# Patient Record
Sex: FEMALE | Race: BLACK OR AFRICAN AMERICAN | Employment: UNEMPLOYED | URBAN - METROPOLITAN AREA
[De-identification: names, ages, dates, MRNs, and addresses within clinical notes are randomized per-mention and may not be internally consistent; named-entity substitution may affect disease eponyms.]

---

## 2018-03-07 ENCOUNTER — OFFICE VISIT (OUTPATIENT)
Dept: ONCOLOGY | Age: 28
End: 2018-03-07

## 2018-03-07 DIAGNOSIS — D69.6 BENIGN GESTATIONAL THROMBOCYTOPENIA, ANTEPARTUM (HCC): Primary | ICD-10-CM

## 2018-03-07 DIAGNOSIS — O99.119 BENIGN GESTATIONAL THROMBOCYTOPENIA, ANTEPARTUM (HCC): Primary | ICD-10-CM

## 2018-03-07 NOTE — MR AVS SNAPSHOT
[address] [facility] [address] 200 [facility] 
[phone] Patient: Char Bowman MRN: [id number]:[id number] Visit Information Date & Time Provider Department Dept. Phone Encounter #  
 3/7/2018 11:30 AM Judith Brown MD [facility] [phone] [id number] Follow-up Instructions Return in about 4 weeks (around 4/4/2018). Your Appointments 4/4/2018 10:45 AM  
Office Visit with Judith Brown MD  
[address]  Oncology [facility] [facility]) Appt Note: 4 weeks [place] [address], [place] 568 [facility] [address] [place] [address], [address] [place] [address] 200 [facility] Upcoming Health Maintenance Date Due DTaP/Tdap/Td series (1 - Tdap) 3/11/2010 PAP AKA CERVICAL CYTOLOGY 3/11/2010 Influenza Age 5 to Adult 8/1/2017 Allergies as of 3/7/2018  Review Complete On: 3/7/2018 By: Daniel Izaguirre NP No Known Allergies Current Immunizations  Never Reviewed No immunizations on file. Not reviewed this visit You Were Diagnosed With   
  
 Codes Comments Benign gestational thrombocytopenia, antepartum ([facility]    -  Primary ICD-10-CM: O99.119, D69.6 ICD-9-CM: 649.33, 287.5 Vitals Smoking Status Never Smoker Your Updated Medication List  
  
   
This list is accurate as of 3/7/18 12:19 PM.  Always use your most recent med list.  
  
  
  
  
 prenatal vitamin 27 mg iron- 800 mcg Tab tablet  
  
 promethazine 12.5 mg tablet Commonly known as:  PHENERGAN Follow-up Instructions Return in about 4 weeks (around 4/4/2018). To-Do List   
 03/07/2018 Lab:  CBC WITH AUTOMATED DIFF   
  
 03/08/2018 Lab:  METABOLIC PANEL, COMPREHENSIVE [facility]! [facility] introduces Perpetuall patient portal. Now you can access parts of your medical record, email your doctor's office, and request medication refills online. 1. In your internet browser, go to [email or web address] 2. Click on the First Time User? Click Here link in the Sign In box. You will see the New Member Sign Up page. 3. Enter your Perpetuall Access Code exactly as it appears below. You will not need to use this code after youve completed the sign-up process. If you do not sign up before the expiration date, you must request a new code. · Perpetuall Access Code: [email or web address] Expires: 6/5/2018 12:18 PM 
 
4. Enter the last four digits of your Social Security Number (xxxx) and Date of Birth (mm/dd/yyyy) as indicated and click Submit. You will be taken to the next sign-up page. 5. Create a Perpetuall ID. This will be your Perpetuall login ID and cannot be changed, so think of one that is secure and easy to remember. 6. Create a Perpetuall password. You can change your password at any time. 7. Enter your Password Reset Question and Answer. This can be used at a later time if you forget your password. 8. Enter your e-mail address. You will receive e-mail notification when new information is available in [address] 9. Click Sign Up. You can now view and download portions of your medical record. 10. Click the Download Summary menu link to download a portable copy of your medical information. If you have questions, please visit the Frequently Asked Questions section of the Perpetuall website. Remember, Perpetuall is NOT to be used for urgent needs. For medical emergencies, dial 911. Now available from your iPhone and Android! Please provide this summary of care documentation to your next provider. Your primary care clinician is listed as Michael Damian. If you have any questions after today's visit, please call [phone].

## 2018-03-07 NOTE — PROGRESS NOTES
Hematology/Oncology  Progress Note    Name: Krysten December  Date: 3/7/2018  : 1990    Jennifer Milton MD     Ms. Chey Resendiz is a 33y.o. year old female who was seen for gestational thrombocytopenia      Subjective:      is in the office for follow-up. She has a history of gestational thrombocytopenia. She was last seen in 2016 during her last pregnancy. She is here today because she is 22 weeks pregnant and needs to have her platelet count monitor. Her ONEL is 2018. She reports she may be scheduled for . She states she received 2 units of platelets following her last delivery. Today she has no complaints to report. She denies abnormal bruising or bleeding. Other than pregnancy related fatigue, the patient has no concerns to report. Past medical history, family history, and social history: these were reviewed and remains unchanged. Past Medical History:   Diagnosis Date    Muscle pain     Recent change in weight      No past surgical history on file. Social History     Social History    Marital status: SINGLE     Spouse name: N/A    Number of children: N/A    Years of education: N/A     Occupational History    Not on file.      Social History Main Topics    Smoking status: Never Smoker    Smokeless tobacco: Never Used    Alcohol use No    Drug use: No    Sexual activity: Yes     Partners: Male     Birth control/ protection: None      Comment: Pregnant      Other Topics Concern    Not on file     Social History Narrative    No narrative on file     Family History   Problem Relation Age of Onset    No Known Problems Mother     No Known Problems Father     No Known Problems Sister     No Known Problems Brother     No Known Problems Maternal Aunt     No Known Problems Maternal Uncle     No Known Problems Paternal Aunt     No Known Problems Paternal Uncle     No Known Problems Maternal Grandmother     No Known Problems Maternal Grandfather     No Known Problems Paternal Grandmother     No Known Problems Paternal Grandfather     No Known Problems Other      Current Outpatient Prescriptions   Medication Sig Dispense Refill    PRENATAL VIT#96/FERROUS FUM/FA (PRENATAL VITAMIN) 27 mg iron- 800 mcg tab tablet   1    promethazine (PHENERGAN) 12.5 mg tablet   0       Review of Systems  Constitutional: The patient has no acute distress or discomfort. HEENT: The patient denies recent head trauma, eye pain, blurred vision,  hearing deficit, oropharyngeal mucosal pain or lesions, and the patient denies throat pain or discomfort. Lymphatics: The patient denies palpable peripheral lymphadenopathy. Hematologic: The patient denies having bruising, bleeding, or progressive fatigue. Respiratory: Patient denies having shortness of breath, cough, sputum production, fever, or dyspnea on exertion. Cardiovascular: The patient denies having leg pain, leg swelling, heart palpitations, chest permit, chest pain, or lightheadedness. The patient denies having dyspnea on exertion. Gastrointestinal: The patient denies having nausea, emesis, or diarrhea. The patient denies having any hematemesis or blood in the stool. Genitourinary: Patient denies having urinary urgency, frequency, or dysuria. The patient denies having blood in the urine. Psychological: The patient denies having symptoms of nervousness, anxiety, depression, or thoughts of harming himself some of this. Skin: Patient denies having skin rashes, skin, ulcerations, or unexplained itching or pruritus. Musculoskeletal: The patient denies having pain in the joints or bones. Objective:     Visit Vitals    BP 95/58 (BP 1 Location: Left arm)    Pulse 89    Temp 99.2 °F (37.3 °C)    Resp 18    Ht 5' 1.5\" (1.562 m)    Wt 57.8 kg (127 lb 6.4 oz)    BMI 23.68 kg/m2     ECOG PS 0  Physical Exam:   Gen. Appearance: The patient is in no acute distress. Skin: There is no bruise or rash.   HEENT: The exam is unremarkable. Neck: Supple without lymphadenopathy or thyromegaly. Lungs: Clear to auscultation and percussion; there are no wheezes or rhonchi. Heart: Regular rate and rhythm; there are no murmurs, gallops, or rubs. Abdomen: Bowel sounds are present and normal.  There is no guarding, tenderness, or hepatosplenomegaly. Extremities: There is no clubbing, cyanosis, or edema. Neurologic: There are no focal neurologic deficits. Lymphatics: There is no palpable peripheral lymphadenopathy. Musculoskeletal: The patient has full range of motion at all joints. There is no evidence of joint deformity or effusions. There is no focal joint tenderness. Psychological/psychiatric: There is no clinical evidence of anxiety, depression, or melancholy. Lab data:      Results for orders placed or performed during the hospital encounter of 08/29/16   CBC WITH 3 PART DIFF     Status: Abnormal   Result Value Ref Range Status    WBC 13.6 (H) 4.5 - 13.0 K/uL Final    RBC 4.17 4.10 - 5.10 M/uL Final    HGB 11.5 (L) 12.0 - 16.0 g/dL Final    HCT 34.0 (L) 36 - 48 % Final    MCV 81.5 78 - 102 FL Final    MCH 27.6 25.0 - 35.0 PG Final    MCHC 33.8 31 - 37 g/dL Final    RDW 12.8 11.5 - 14.5 % Final    NEUTROPHILS 71 (H) 40 - 70 % Final    MIXED CELLS 9 0.1 - 17 % Final    LYMPHOCYTES 20 14 - 44 % Final    ABS. NEUTROPHILS 9.6 (H) 1.8 - 9.5 K/UL Final    ABS. MIXED CELLS 1.2 0.0 - 2.3 K/uL Final    ABS. LYMPHOCYTES 2.8 1.1 - 5.9 K/UL Final     Comment: Test performed at [place] [address] Location. Results Reviewed by Medical Director. DF AUTOMATED   Final           Assessment:     1. Benign gestational thrombocytopenia, antepartum ([facility]          Plan:   The CBC with platelet count will be sent out for interpretation. During her last pregnancy,the patient was advised that If the platelet count should decline below 20,000, a Platelet transfusions and gammaglobulin will be recommended.  Otherwise, we will monitor her closely for the duration of her pregnancy. She will have a CBC every 2 weeks. The patient will probably need to have a platelet transfusion immediately before delivery, particularly if the platelet counts remain below 100,000. She will continue to have a complete assessment every 4 weeks. She was advised to contact this office immediately for any unexplained bruising or bleeding. Orders Placed This Encounter    CBC WITH AUTOMATED DIFF     Standing Status:   Future     Number of Occurrences:   1     Standing Expiration Date:   7/7/6414    METABOLIC PANEL, COMPREHENSIVE     Standing Status:   Future     Number of Occurrences:   1     Standing Expiration Date:   3/8/2019       Lee Verdugo NP  3/7/2018      I have assessed the patient independently and  agree with the full assessment as outlined. Camacho Guerrero MD, FACP    Please note: This document has been produced using voice recognition software. Unrecognized errors in transcription may be present.

## 2018-03-08 ENCOUNTER — TELEPHONE (OUTPATIENT)
Dept: ONCOLOGY | Age: 28
End: 2018-03-08

## 2018-03-08 LAB
ALBUMIN SERPL-MCNC: 3.6 G/DL (ref 3.5–5.5)
ALBUMIN/GLOB SERPL: 1.3 {RATIO} (ref 1.2–2.2)
ALP SERPL-CCNC: 78 IU/L (ref 39–117)
ALT SERPL-CCNC: 7 IU/L (ref 0–32)
AST SERPL-CCNC: 8 IU/L (ref 0–40)
BASOPHILS # BLD AUTO: 0.1 X10E3/UL (ref 0–0.2)
BASOPHILS NFR BLD AUTO: 1 %
BILIRUB SERPL-MCNC: 0.3 MG/DL (ref 0–1.2)
BUN SERPL-MCNC: 6 MG/DL (ref 6–20)
BUN/CREAT SERPL: 13 (ref 9–23)
CALCIUM SERPL-MCNC: 9.1 MG/DL (ref 8.7–10.2)
CHLORIDE SERPL-SCNC: 101 MMOL/L (ref 96–106)
CO2 SERPL-SCNC: 22 MMOL/L (ref 18–29)
CREAT SERPL-MCNC: 0.46 MG/DL (ref 0.57–1)
EOSINOPHIL # BLD AUTO: 0.3 X10E3/UL (ref 0–0.4)
EOSINOPHIL NFR BLD AUTO: 2 %
ERYTHROCYTE [DISTWIDTH] IN BLOOD BY AUTOMATED COUNT: 13.1 % (ref 12.3–15.4)
GFR SERPLBLD CREATININE-BSD FMLA CKD-EPI: 136 ML/MIN/1.73
GFR SERPLBLD CREATININE-BSD FMLA CKD-EPI: 157 ML/MIN/1.73
GLOBULIN SER CALC-MCNC: 2.8 G/DL (ref 1.5–4.5)
GLUCOSE SERPL-MCNC: 74 MG/DL (ref 65–99)
HCT VFR BLD AUTO: 34.3 % (ref 34–46.6)
HGB BLD-MCNC: 11.4 G/DL (ref 11.1–15.9)
IMM GRANULOCYTES # BLD: 0.2 X10E3/UL (ref 0–0.1)
IMM GRANULOCYTES NFR BLD: 1 %
LYMPHOCYTES # BLD AUTO: 2.2 X10E3/UL (ref 0.7–3.1)
LYMPHOCYTES NFR BLD AUTO: 18 %
MCH RBC QN AUTO: 27.7 PG (ref 26.6–33)
MCHC RBC AUTO-ENTMCNC: 33.2 G/DL (ref 31.5–35.7)
MCV RBC AUTO: 83 FL (ref 79–97)
MONOCYTES # BLD AUTO: 0.9 X10E3/UL (ref 0.1–0.9)
MONOCYTES NFR BLD AUTO: 7 %
MORPHOLOGY BLD-IMP: ABNORMAL
NEUTROPHILS # BLD AUTO: 8.9 X10E3/UL (ref 1.4–7)
NEUTROPHILS NFR BLD AUTO: 71 %
PLATELET # BLD AUTO: 62 X10E3/UL (ref 150–379)
POTASSIUM SERPL-SCNC: 3.9 MMOL/L (ref 3.5–5.2)
PROT SERPL-MCNC: 6.4 G/DL (ref 6–8.5)
RBC # BLD AUTO: 4.12 X10E6/UL (ref 3.77–5.28)
SODIUM SERPL-SCNC: 136 MMOL/L (ref 134–144)
WBC # BLD AUTO: 12.5 X10E3/UL (ref 3.4–10.8)

## 2018-03-19 VITALS
RESPIRATION RATE: 18 BRPM | DIASTOLIC BLOOD PRESSURE: 58 MMHG | HEART RATE: 89 BPM | BODY MASS INDEX: 23.45 KG/M2 | TEMPERATURE: 99.2 F | SYSTOLIC BLOOD PRESSURE: 95 MMHG | HEIGHT: 62 IN | WEIGHT: 127.4 LBS

## 2018-04-04 ENCOUNTER — OFFICE VISIT (OUTPATIENT)
Dept: ONCOLOGY | Age: 28
End: 2018-04-04

## 2018-04-04 ENCOUNTER — HOSPITAL ENCOUNTER (OUTPATIENT)
Dept: ONCOLOGY | Age: 28
Discharge: HOME | End: 2018-04-04

## 2018-04-04 VITALS
BODY MASS INDEX: 24.07 KG/M2 | SYSTOLIC BLOOD PRESSURE: 104 MMHG | HEART RATE: 100 BPM | HEIGHT: 62 IN | TEMPERATURE: 98.8 F | WEIGHT: 130.8 LBS | DIASTOLIC BLOOD PRESSURE: 60 MMHG

## 2018-04-04 DIAGNOSIS — D69.6 BENIGN GESTATIONAL THROMBOCYTOPENIA, ANTEPARTUM (HCC): ICD-10-CM

## 2018-04-04 DIAGNOSIS — D69.6 BENIGN GESTATIONAL THROMBOCYTOPENIA, ANTEPARTUM (HCC): Primary | ICD-10-CM

## 2018-04-04 DIAGNOSIS — O99.119 BENIGN GESTATIONAL THROMBOCYTOPENIA, ANTEPARTUM (HCC): Primary | ICD-10-CM

## 2018-04-04 DIAGNOSIS — O99.119 BENIGN GESTATIONAL THROMBOCYTOPENIA, ANTEPARTUM (HCC): ICD-10-CM

## 2018-04-04 LAB
BASO+EOS+MONOS # BLD AUTO: 1.3 K/UL (ref 0–2.3)
BASO+EOS+MONOS # BLD AUTO: 9 % (ref 0.1–17)
DIFFERENTIAL METHOD BLD: ABNORMAL
ERYTHROCYTE [DISTWIDTH] IN BLOOD BY AUTOMATED COUNT: 12.2 % (ref 11.5–14.5)
HCT VFR BLD AUTO: 31.4 % (ref 36–48)
HGB BLD-MCNC: 10.7 G/DL (ref 12–16)
LYMPHOCYTES # BLD: 2 K/UL (ref 1.1–5.9)
LYMPHOCYTES NFR BLD: 14 % (ref 14–44)
MCH RBC QN AUTO: 28.2 PG (ref 25–35)
MCHC RBC AUTO-ENTMCNC: 34.1 G/DL (ref 31–37)
MCV RBC AUTO: 82.8 FL (ref 78–102)
NEUTS SEG # BLD: 10.9 K/UL (ref 1.8–9.5)
NEUTS SEG NFR BLD: 76 % (ref 40–70)
PLATELET # BLD AUTO: 61 K/UL (ref 135–420)
RBC # BLD AUTO: 3.79 M/UL (ref 4.1–5.1)
WBC # BLD AUTO: 14.2 K/UL (ref 4.5–13)

## 2018-04-04 NOTE — PROGRESS NOTES
Hematology/Oncology  Progress Note    Name: Milli Stewart  Date: 3/7/2018  : 1990    Melisa Skinner MD     Ms. Kaitlynn Quiñones is a 33y.o. year old female who was seen for gestational thrombocytopenia      Subjective:      is in the office for follow-up. She has a history of gestational thrombocytopenia. Today she reports that she is doing reasonably well. She has not experienced any unexplained bruising or bleeding. On 3/8/2017 a platelet count was 18,421. She is here today for follow-up visit. She is now 26 weeks pregnant. The patient reports that she is planning to have a  done 1 week 44 of her pregnancy. With her last pregnancy she required a platelet transfusion at the time of delivery and she is anticipating that she probably will need a platelet transfusion at the time of this delivery as well. Past medical history, family history, and social history: these were reviewed and remains unchanged. Past Medical History:   Diagnosis Date    Muscle pain     Recent change in weight      No past surgical history on file. Social History     Social History    Marital status: SINGLE     Spouse name: N/A    Number of children: N/A    Years of education: N/A     Occupational History    Not on file.      Social History Main Topics    Smoking status: Never Smoker    Smokeless tobacco: Never Used    Alcohol use No    Drug use: No    Sexual activity: Yes     Partners: Male     Birth control/ protection: None      Comment: Pregnant      Other Topics Concern    Not on file     Social History Narrative    No narrative on file     Family History   Problem Relation Age of Onset    No Known Problems Mother     No Known Problems Father     No Known Problems Sister     No Known Problems Brother     No Known Problems Maternal Aunt     No Known Problems Maternal Uncle     No Known Problems Paternal Aunt     No Known Problems Paternal Uncle     No Known Problems Maternal Grandmother     No Known Problems Maternal Grandfather     No Known Problems Paternal Grandmother     No Known Problems Paternal Grandfather     No Known Problems Other      Current Outpatient Prescriptions   Medication Sig Dispense Refill    PRENATAL VIT#96/FERROUS FUM/FA (PRENATAL VITAMIN) 27 mg iron- 800 mcg tab tablet   1    promethazine (PHENERGAN) 12.5 mg tablet   0       Review of Systems  Constitutional: The patient has no acute distress or discomfort. HEENT: The patient denies recent head trauma, eye pain, blurred vision,  hearing deficit, oropharyngeal mucosal pain or lesions, and the patient denies throat pain or discomfort. Lymphatics: The patient denies palpable peripheral lymphadenopathy. Hematologic: The patient denies having bruising, bleeding, or progressive fatigue. Respiratory: Patient denies having shortness of breath, cough, sputum production, fever, or dyspnea on exertion. Cardiovascular: The patient denies having leg pain, leg swelling, heart palpitations, chest permit, chest pain, or lightheadedness. The patient denies having dyspnea on exertion. Gastrointestinal: The patient denies having nausea, emesis, or diarrhea. The patient denies having any hematemesis or blood in the stool. Genitourinary: Patient denies having urinary urgency, frequency, or dysuria. The patient denies having blood in the urine. Psychological: The patient denies having symptoms of nervousness, anxiety, depression, or thoughts of harming himself some of this. Skin: Patient denies having skin rashes, skin, ulcerations, or unexplained itching or pruritus. Musculoskeletal: The patient denies having pain in the joints or bones. Objective:     Visit Vitals    /60    Pulse 100    Temp 98.8 °F (37.1 °C)    Ht 5' 1.5\" (1.562 m)    Wt 59.3 kg (130 lb 12.8 oz)    BMI 24.31 kg/m2     ECOG PS 0  Physical Exam:   Gen. Appearance: The patient is in no acute distress.   Skin: There is no bruise or rash.  HEENT: The exam is unremarkable. Neck: Supple without lymphadenopathy or thyromegaly. Lungs: Clear to auscultation and percussion; there are no wheezes or rhonchi. Heart: Regular rate and rhythm; there are no murmurs, gallops, or rubs. Abdomen: Bowel sounds are present and normal.  There is no guarding, tenderness, or hepatosplenomegaly. Extremities: There is no clubbing, cyanosis, or edema. Neurologic: There are no focal neurologic deficits. Lymphatics: There is no palpable peripheral lymphadenopathy. Musculoskeletal: The patient has full range of motion at all joints. There is no evidence of joint deformity or effusions. There is no focal joint tenderness. Psychological/psychiatric: There is no clinical evidence of anxiety, depression, or melancholy. Lab data:      Results for orders placed or performed during the hospital encounter of 04/04/18   CBC WITH 3 PART DIFF     Status: Abnormal   Result Value Ref Range Status    WBC 14.2 (H) 4.5 - 13.0 K/uL Final    RBC 3.79 (L) 4.10 - 5.10 M/uL Final    HGB 10.7 (L) 12.0 - 16 g/dL Final    HCT 31.4 (L) 36 - 48 % Final    MCV 82.8 78 - 102 FL Final    MCH 28.2 25.0 - 35.0 PG Final    MCHC 34.1 31 - 37 g/dL Final    RDW 12.2 11.5 - 14.5 % Final    NEUTROPHILS 76 (H) 40 - 70 % Final    MIXED CELLS 9 0.1 - 17 % Final    LYMPHOCYTES 14 14 - 44 % Final    ABS. NEUTROPHILS 10.9 (H) 1.8 - 9.5 K/UL Final    ABS. MIXED CELLS 1.3 0.0 - 2.3 K/uL Final    ABS. LYMPHOCYTES 2.0 1.1 - 5.9 K/UL Final     Comment: Test performed at [place] [address] Location. Results Reviewed by Medical Director. DF AUTOMATED   Final           Assessment:     1. Benign gestational thrombocytopenia, antepartum ([facility]          Plan:   Gestational thrombocytopenia: The CBC from today reveals that her WBC count is 14.2, hemoglobin is 10.7 g/dL, hematocrit is 31.4%, and her platelet count today is 59,000. I will recheck her again in 6 weeks.   We will coordinate with her OB/GYN physician for platelet transfusion at the time of her elective  if needed. I have also explained to the patient if the platelet count should decline below 20,000 on pregnancy she will be offered gammaglobulin as well. I will have the patient return to clinic for complete reassessment again in 6 weeks. Orders Placed This Encounter    COMPLETE CBC & AUTO DIFF WBC    InHouse CBC ([employer])     Standing Status:   Future     Number of Occurrences:   1     Standing Expiration Date:   2018         Nam Huerta MD, FACP  2018    Please note: This document has been produced using voice recognition software. Unrecognized errors in transcription may be present.

## 2018-04-04 NOTE — PATIENT INSTRUCTIONS
Thrombocytopenia: Care Instructions  Your Care Instructions    Thrombocytopenia is a low number of platelets in the blood. Platelets are the cells that help blood clot. If you don't have enough of them, your blood cannot clot well. So it is harder to stop bleeding. You may have low platelets because your bone marrow does not make them. Or your body's defenses (immune system) may destroy them. Having an enlarged spleen can also reduce the number of platelets in your blood. This is because they can get trapped in the enlarged spleen. Some diseases or medicines may also cause low platelets. But platelets may go back to normal levels if the disease is treated or the medicine is stopped. You may not need treatment if your problem is mild. If you do need treatment, you may have platelets added to your blood. Or you may get medicine to stop the loss of platelets or help your body make them. Follow-up care is a key part of your treatment and safety. Be sure to make and go to all appointments, and call your doctor if you are having problems. It's also a good idea to know your test results and keep a list of the medicines you take. How can you care for yourself at home? · Be safe with medicines. Take your medicines exactly as prescribed. Call your doctor if you think you are having a problem with your medicine. · Do not take aspirin or anti-inflammatory medicines unless your doctor says it is okay. Examples are ibuprofen (Advil, Motrin) and naproxen (Aleve). They may increase the risk of bleeding. · Avoid contact sports or activities that could cause you to fall. When should you call for help? Call 911 anytime you think you may need emergency care. For example, call if:  ? · You passed out (lost consciousness). ? · You have signs of severe bleeding, which includes:  ¨ You have a severe headache that is different from past headaches. ¨ You vomit blood or what looks like coffee grounds.   ¨ Your stools are maroon or very bloody. ?Call your doctor now or seek immediate medical care if:  ? · You are dizzy or lightheaded, or you feel like you may faint. ? · You have abnormal bleeding, such as:  ¨ Your stools are black and look like tar, or they have streaks of blood. ¨ You have blood in your urine. ¨ You have joint pain. ¨ You have bruises or blood spots under your skin. ? Watch closely for changes in your health, and be sure to contact your doctor if:  ? · You do not get better as expected. Where can you learn more? Go to http://andrés-marline.info/. Enter P803 in the search box to learn more about \"Thrombocytopenia: Care Instructions. \"  Current as of: October 13, 2016  Content Version: 11.4  © 5010-6242 [employer]. Care instructions adapted under license by [employer] (which disclaims liability or warranty for this information). If you have questions about a medical condition or this instruction, always ask your healthcare professional. [place] [address] any warranty or liability for your use of this information.

## 2018-04-04 NOTE — MR AVS SNAPSHOT
[address] [facility] 
 
 
 [place] [address] [address] 200 [facility] 
[phone] Patient: Betty Schofield MRN: [id number] LFA:[birth year] Visit Information Date & Time Provider Department Dept. Phone Encounter #  
 4/4/2018 10:45 AM Usman Harding MD [facility] Oncology [phone] [id number] Follow-up Instructions Return in about 6 weeks (around 5/16/2018). Your Appointments 5/16/2018 11:00 AM  
Office Visit with Usman Harding MD  
[address]  [facility] Appt Note: [facility] week [facility], [address], [address] [place] [address] 200 [facility] Upcoming Health Maintenance Date Due DTaP/Tdap/Td series (1 - Tdap) 3/11/2010 PAP AKA CERVICAL CYTOLOGY 3/11/2010 Influenza Age 5 to Adult 8/1/2017 Allergies as of 4/4/2018  Review Complete On: 4/4/2018 By: Usman Harding MD  
 No Known Allergies Current Immunizations  Never Reviewed No immunizations on file. Not reviewed this visit You Were Diagnosed With   
  
 Codes Comments Benign gestational thrombocytopenia, antepartum ([facility]    -  Primary ICD-10-CM: O99.119, D69.6 ICD-9-CM: 649.33, 287.5 Vitals BP Pulse Temp Height(growth percentile) Weight(growth percentile) BMI  
 104/60 100 98.8 °F (37.1 °C) 5' 1.5\" (1.562 m) 130 lb 12.8 oz (59.3 kg) 24.31 kg/m2 Smoking Status Never Smoker BMI and BSA Data Body Mass Index Body Surface Area  
 24.31 kg/m 2 1.6 m 2 Your Updated Medication List  
  
   
This list is accurate as of 4/4/18 11:29 AM.  Always use your most recent med list.  
  
  
  
  
 prenatal vitamin 27 mg iron- 800 mcg Tab tablet  
  
 promethazine 12.5 mg tablet Commonly known as:  PHENERGAN We Performed the Following COMPLETE CBC & AUTO DIFF WBC [22817 CPT(R)] METABOLIC PANEL, COMPREHENSIVE [97898 CPT(R)] Follow-up Instructions Return in about 6 weeks (around 5/16/2018). To-Do List   
 04/04/2018 Lab:  CBC WITH 3 PART DIFF   
  
 04/04/2018 Lab:  METABOLIC PANEL, COMPREHENSIVE Patient Instructions Thrombocytopenia: Care Instructions Your Care Instructions Thrombocytopenia is a low number of platelets in the blood. Platelets are the cells that help blood clot. If you don't have enough of them, your blood cannot clot well. So it is harder to stop bleeding. You may have low platelets because your bone marrow does not make them. Or your body's defenses (immune system) may destroy them. Having an enlarged spleen can also reduce the number of platelets in your blood. This is because they can get trapped in the enlarged spleen. Some diseases or medicines may also cause low platelets. But platelets may go back to normal levels if the disease is treated or the medicine is stopped. You may not need treatment if your problem is mild. If you do need treatment, you may have platelets added to your blood. Or you may get medicine to stop the loss of platelets or help your body make them. Follow-up care is a key part of your treatment and safety. Be sure to make and go to all appointments, and call your doctor if you are having problems. It's also a good idea to know your test results and keep a list of the medicines you take. How can you care for yourself at home? · Be safe with medicines. Take your medicines exactly as prescribed. Call your doctor if you think you are having a problem with your medicine. · Do not take aspirin or anti-inflammatory medicines unless your doctor says it is okay. Examples are ibuprofen (Advil, Motrin) and naproxen (Aleve). They may increase the risk of bleeding. · Avoid contact sports or activities that could cause you to fall. When should you call for help? Call 911 anytime you think you may need emergency care. For example, call if: 
? · You passed out (lost consciousness). ? · You have signs of severe bleeding, which includes: 
¨ You have a severe headache that is different from past headaches. ¨ You vomit blood or what looks like coffee grounds. ¨ Your stools are maroon or very bloody. ?Call your doctor now or seek immediate medical care if: 
? · You are dizzy or lightheaded, or you feel like you may faint. ? · You have abnormal bleeding, such as: 
¨ Your stools are black and look like tar, or they have streaks of blood. ¨ You have blood in your urine. ¨ You have joint pain. ¨ You have bruises or blood spots under your skin. ? Watch closely for changes in your health, and be sure to contact your doctor if: 
? · You do not get better as expected. Where can you learn more? Go to http://andrés-marline.info/. Enter D059 in the search box to learn more about \"Thrombocytopenia: Care Instructions. \" Current as of: October 13, 2016 Content Version: 11.4 © 6243-8520 [employer]. Care instructions adapted under license by [employer] (which disclaims liability or warranty for this information). If you have questions about a medical condition or this instruction, always ask your healthcare professional. [address] any warranty or liability for your use of this information. Introducing [facility] [place] & HEALTH SERVICES! [employer] introduces [employer] patient portal. Now you can access parts of your medical record, email your doctor's office, and request medication refills online. 1. In your internet browser, go to [email or web address] 2. Click on the First Time User? Click Here link in the Sign In box. You will see the New Member Sign Up page. 3. Enter your Teladoc Access Code exactly as it appears below.  You will not need to use this code after youve completed the sign-up process. If you do not sign up before the expiration date, you must request a new code. · [employer] Access Code: [email or web address] Expires: 6/5/2018  1:18 PM 
 
4. Enter the last four digits of your Social Security Number (xxxx) and Date of Birth (mm/dd/yyyy) as indicated and click Submit. You will be taken to the next sign-up page. 5. Create a [employer] ID. This will be your [employer] login ID and cannot be changed, so think of one that is secure and easy to remember. 6. Create a [employer] password. You can change your password at any time. 7. Enter your Password Reset Question and Answer. This can be used at a later time if you forget your password. 8. Enter your e-mail address. You will receive e-mail notification when new information is available in [address] 9. Click Sign Up. You can now view and download portions of your medical record. 10. Click the Download Summary menu link to download a portable copy of your medical information. If you have questions, please visit the Frequently Asked Questions section of the [employer] website. Remember, [employer] is NOT to be used for urgent needs. For medical emergencies, dial 911. Now available from your iPhone and Android! Please provide this summary of care documentation to your next provider. Your primary care clinician is listed as Pierce Valenzuela. If you have any questions after today's visit, please call [phone].

## 2018-04-05 ENCOUNTER — TELEPHONE (OUTPATIENT)
Dept: ONCOLOGY | Age: 28
End: 2018-04-05

## 2018-04-05 LAB
ALBUMIN SERPL-MCNC: 3.3 G/DL (ref 3.5–5.5)
ALBUMIN/GLOB SERPL: 1.4 {RATIO} (ref 1.2–2.2)
ALP SERPL-CCNC: 81 IU/L (ref 39–117)
ALT SERPL-CCNC: 5 IU/L (ref 0–32)
AST SERPL-CCNC: 10 IU/L (ref 0–40)
BILIRUB SERPL-MCNC: 0.2 MG/DL (ref 0–1.2)
BUN SERPL-MCNC: 6 MG/DL (ref 6–20)
BUN/CREAT SERPL: 13 (ref 9–23)
CALCIUM SERPL-MCNC: 8.5 MG/DL (ref 8.7–10.2)
CHLORIDE SERPL-SCNC: 102 MMOL/L (ref 96–106)
CO2 SERPL-SCNC: 21 MMOL/L (ref 18–29)
CREAT SERPL-MCNC: 0.46 MG/DL (ref 0.57–1)
GFR SERPLBLD CREATININE-BSD FMLA CKD-EPI: 135 ML/MIN/1.73
GFR SERPLBLD CREATININE-BSD FMLA CKD-EPI: 155 ML/MIN/1.73
GLOBULIN SER CALC-MCNC: 2.4 G/DL (ref 1.5–4.5)
GLUCOSE SERPL-MCNC: 74 MG/DL (ref 65–99)
POTASSIUM SERPL-SCNC: 3.9 MMOL/L (ref 3.5–5.2)
PROT SERPL-MCNC: 5.7 G/DL (ref 6–8.5)
SODIUM SERPL-SCNC: 138 MMOL/L (ref 134–144)

## 2018-04-05 NOTE — TELEPHONE ENCOUNTER
We are aware of the patients 68,000 platelet count and are following the patient closely. She has gestational thrombocytopenia.

## 2018-04-24 ENCOUNTER — TELEPHONE (OUTPATIENT)
Dept: ONCOLOGY | Age: 28
End: 2018-04-24

## 2018-04-24 NOTE — TELEPHONE ENCOUNTER
MAKENZIE @ [facility],  ph [phone]  said Dr Stiven Cotton wants last platelet levels    Please call them back with that, or if you prefer, fax it to [phone].

## 2018-04-25 ENCOUNTER — DOCUMENTATION ONLY (OUTPATIENT)
Dept: ONCOLOGY | Age: 28
End: 2018-04-25

## 2018-05-16 ENCOUNTER — OFFICE VISIT (OUTPATIENT)
Dept: ONCOLOGY | Age: 28
End: 2018-05-16

## 2018-05-16 ENCOUNTER — HOSPITAL ENCOUNTER (OUTPATIENT)
Dept: ONCOLOGY | Age: 28
Discharge: HOME | End: 2018-05-16

## 2018-05-16 VITALS — WEIGHT: 134 LBS | BODY MASS INDEX: 24.91 KG/M2

## 2018-05-16 DIAGNOSIS — D69.6 BENIGN GESTATIONAL THROMBOCYTOPENIA, ANTEPARTUM (HCC): ICD-10-CM

## 2018-05-16 DIAGNOSIS — O99.119 BENIGN GESTATIONAL THROMBOCYTOPENIA, ANTEPARTUM (HCC): Primary | ICD-10-CM

## 2018-05-16 DIAGNOSIS — O99.013 ANEMIA AFFECTING PREGNANCY IN THIRD TRIMESTER: ICD-10-CM

## 2018-05-16 DIAGNOSIS — D69.6 BENIGN GESTATIONAL THROMBOCYTOPENIA, ANTEPARTUM (HCC): Primary | ICD-10-CM

## 2018-05-16 DIAGNOSIS — O99.119 BENIGN GESTATIONAL THROMBOCYTOPENIA, ANTEPARTUM (HCC): ICD-10-CM

## 2018-05-16 LAB
BASO+EOS+MONOS # BLD AUTO: 1 K/UL (ref 0–2.3)
BASO+EOS+MONOS # BLD AUTO: 9 % (ref 0.1–17)
DIFFERENTIAL METHOD BLD: ABNORMAL
ERYTHROCYTE [DISTWIDTH] IN BLOOD BY AUTOMATED COUNT: 12.5 % (ref 11.5–14.5)
HCT VFR BLD AUTO: 30.2 % (ref 36–48)
HGB BLD-MCNC: 10.2 G/DL (ref 12–16)
LYMPHOCYTES # BLD: 1.9 K/UL (ref 1.1–5.9)
LYMPHOCYTES NFR BLD: 17 % (ref 14–44)
MCH RBC QN AUTO: 27.9 PG (ref 25–35)
MCHC RBC AUTO-ENTMCNC: 33.8 G/DL (ref 31–37)
MCV RBC AUTO: 82.7 FL (ref 78–102)
NEUTS SEG # BLD: 8.3 K/UL (ref 1.8–9.5)
NEUTS SEG NFR BLD: 75 % (ref 40–70)
RBC # BLD AUTO: 3.65 M/UL (ref 4.1–5.1)
WBC # BLD AUTO: 11.2 K/UL (ref 4.5–13)

## 2018-05-16 NOTE — MR AVS SNAPSHOT
[address] [place] [address] 200 [facility] 
[phone] Patient: Theodore Zaman MRN: [id number] DDO:4/46/1652 Visit Information Date & Time Provider Department Dept. Phone Encounter #  
 5/16/2018 11:00 AM Sergio Dejesus MD [facility] Oncology [phone] [id number] Follow-up Instructions Return in about 4 weeks (around 6/13/2018). Your Appointments 6/13/2018 10:45 AM  
Office Visit with Sergio Dejesus MD  
[address]  Oncology [facility]) Appt Note: 4 weeks [place] [address], [place] [address] [facility] [address] [place] [address], [address] [place] [address] Upcoming Health Maintenance Date Due DTaP/Tdap/Td series (1 - Tdap) 3/11/2010 PAP AKA CERVICAL CYTOLOGY 3/11/2010 Influenza Age 5 to Adult 8/1/2018 Allergies as of 5/16/2018  Review Complete On: 5/16/2018 By: Sergio Dejesus MD  
 No Known Allergies Current Immunizations  Never Reviewed No immunizations on file. Not reviewed this visit You Were Diagnosed With   
  
 Codes Comments Benign gestational thrombocytopenia, antepartum ([facility]    -  Primary ICD-10-CM: O99.119, D69.6 ICD-9-CM: 649.33, 287.5 Anemia affecting pregnancy in third trimester     ICD-10-CM: O99.013 ICD-9-CM: 648.23, 285.9 Vitals Weight(growth percentile) BMI Smoking Status 134 lb (60.8 kg) 24.91 kg/m2 Never Smoker BMI and BSA Data Body Mass Index Body Surface Area 24.91 kg/m 2 1.62 m 2 Your Updated Medication List  
  
   
This list is accurate as of 5/16/18 11:59 AM.  Always use your most recent med list.  
  
  
  
  
 prenatal vitamin 27 mg iron- 800 mcg Tab tablet  
  
 promethazine 12.5 mg tablet Commonly known as:  PHENERGAN We Performed the Following COMPLETE CBC & AUTO DIFF WBC [08974 CPT(R)] METABOLIC PANEL, COMPREHENSIVE [91059 CPT(R)] Follow-up Instructions Return in about 4 weeks (around 6/13/2018). To-Do List   
 05/16/2018 Lab:  CBC WITH 3 PART DIFF   
  
 05/17/2018 Lab:  FERRITIN   
  
 05/17/2018 Lab:  IRON PROFILE   
  
 05/17/2018 Lab:  METABOLIC PANEL, COMPREHENSIVE Introducing [facility] & HEALTH SERVICES! [facility] introduces "[employer] The Simple, Fast and Elegant Tablet Sales App" patient portal. Now you can access parts of your medical record, email your doctor's office, and request medication refills online. 1. In your internet browser, go to [email or web address] 2. Click on the First Time User? Click Here link in the Sign In box. You will see the New Member Sign Up page. 3. Enter your "[employer] Simple[employer] Fast [employer] Elegant [employer]t [employer] App" Access Code exactly as it appears below. You will not need to use this code after youve completed the sign-up process. If you do not sign up before the expiration date, you must request a new code. · "[employer] - [employer] Simple, Fast and Elegant Tablet [employer] App" Access Code: [email or web address] Expires: 6/5/2018  1:18 PM 
 
4. Enter the last four digits of your Social Security Number (xxxx) and Date of Birth (mm/dd/yyyy) as indicated and click Submit. You will be taken to the next sign-up page. 5. Create a "[employer] Simple, Fast and Elegant Tablet [employer] App" ID. This will be your "[employer] Simple[employer] Fast [employer] E[employer]ant [employer]t [employer] App" login ID and cannot be changed, so think of one that is secure and easy to remember. 6. Create a "[employer] Simple[employer] Fast and E[employer]ant Tablet [employer] App" password. You can change your password at any time. 7. Enter your Password Reset Question and Answer. This can be used at a later time if you forget your password. 8. Enter your e-mail address. You will receive e-mail notification when new information is available in [address] 9. Click Sign Up. You can now view and download portions of your medical record. 10. Click the Download Summary menu link to download a portable copy of your medical information.  
 
If you have questions, please visit the Frequently Asked Questions section of the Tagged. Remember, [employer] is NOT to be used for urgent needs. For medical emergencies, dial 911. Now available from your iPhone and Android! Please provide this summary of care documentation to your next provider. Your primary care clinician is listed as Dinh Pace. If you have any questions after today's visit, please call [phone].

## 2018-05-16 NOTE — PROGRESS NOTES
Hematology/Oncology  Progress Note    Name: Theodore Zaman  Date: 18  : 1990    María Harmon MD     Ms. Alyssa Alford is a 33y.o. year old female who was seen for gestational thrombocytopenia      Subjective:      is in the office for follow-up. She has a history of gestational thrombocytopenia. Today she reports that she is doing reasonably well. She has not experienced any unexplained bruising or bleeding. She is here today for follow-up visit. She is now 32 weeks pregnant. The patient reports that she is unsure if she will be scheduled for C section. She is scheduled to have a f/u with her OB/GYN on 18 and will be updated on the plan at that time. With her last pregnancy she required a platelet transfusion at the time of delivery and she is anticipating that she probably will need a platelet transfusion at the time of this delivery as well. Past medical history, family history, and social history: these were reviewed and remains unchanged. Past Medical History:   Diagnosis Date    Muscle pain     Recent change in weight      No past surgical history on file. Social History     Social History    Marital status: SINGLE     Spouse name: N/A    Number of children: N/A    Years of education: N/A     Occupational History    Not on file.      Social History Main Topics    Smoking status: Never Smoker    Smokeless tobacco: Never Used    Alcohol use No    Drug use: No    Sexual activity: Yes     Partners: Male     Birth control/ protection: None      Comment: Pregnant      Other Topics Concern    Not on file     Social History Narrative    No narrative on file     Family History   Problem Relation Age of Onset    No Known Problems Mother     No Known Problems Father     No Known Problems Sister     No Known Problems Brother     No Known Problems Maternal Aunt     No Known Problems Maternal Uncle     No Known Problems Paternal Aunt     No Known Problems Paternal Uncle     No Known Problems Maternal Grandmother     No Known Problems Maternal Grandfather     No Known Problems Paternal Grandmother     No Known Problems Paternal Grandfather     No Known Problems Other      Current Outpatient Prescriptions   Medication Sig Dispense Refill    PRENATAL VIT#96/FERROUS FUM/FA (PRENATAL VITAMIN) 27 mg iron- 800 mcg tab tablet   1    promethazine (PHENERGAN) 12.5 mg tablet   0       Review of Systems  Constitutional: The patient has no acute distress or discomfort. HEENT: The patient denies recent head trauma, eye pain, blurred vision,  hearing deficit, oropharyngeal mucosal pain or lesions, and the patient denies throat pain or discomfort. Lymphatics: The patient denies palpable peripheral lymphadenopathy. Hematologic: The patient denies having bruising, bleeding, or progressive fatigue. Respiratory: Patient denies having shortness of breath, cough, sputum production, fever, or dyspnea on exertion. Cardiovascular: The patient denies having leg pain, leg swelling, heart palpitations, chest permit, chest pain, or lightheadedness. The patient denies having dyspnea on exertion. Gastrointestinal: The patient denies having nausea, emesis, or diarrhea. The patient denies having any hematemesis or blood in the stool. Genitourinary: Patient denies having urinary urgency, frequency, or dysuria. The patient denies having blood in the urine. Psychological: The patient denies having symptoms of nervousness, anxiety, depression, or thoughts of harming himself some of this. Skin: Patient denies having skin rashes, skin, ulcerations, or unexplained itching or pruritus. Musculoskeletal: The patient denies having pain in the joints or bones. Objective:     Visit Vitals    Wt 60.8 kg (134 lb)    BMI 24.91 kg/m2     ECOG PS 0  Physical Exam:   Gen. Appearance: The patient is in no acute distress. Skin: There is no bruise or rash. HEENT: The exam is unremarkable. Neck: Supple without lymphadenopathy or thyromegaly. Lungs: Clear to auscultation and percussion; there are no wheezes or rhonchi. Heart: Regular rate and rhythm; there are no murmurs, gallops, or rubs. Abdomen: Bowel sounds are present and normal.  There is no guarding, tenderness, or hepatosplenomegaly. Extremities: There is no clubbing, cyanosis, or edema. Neurologic: There are no focal neurologic deficits. Lymphatics: There is no palpable peripheral lymphadenopathy. Musculoskeletal: The patient has full range of motion at all joints. There is no evidence of joint deformity or effusions. There is no focal joint tenderness. Psychological/psychiatric: There is no clinical evidence of anxiety, depression, or melancholy. Lab data:      Results for orders placed or performed during the hospital encounter of 05/16/18   CBC WITH 3 PART DIFF     Status: Abnormal   Result Value Ref Range Status    WBC 11.2 4.5 - 13.0 K/uL Final    RBC 3.65 (L) 4.10 - 5.10 M/uL Final    HGB 10.2 (L) 12.0 - 16 g/dL Final    HCT 30.2 (L) 36 - 48 % Final    MCV 82.7 78 - 102 FL Final    MCH 27.9 25.0 - 35.0 PG Final    MCHC 33.8 31 - 37 g/dL Final    RDW 12.5 11.5 - 14.5 % Final    NEUTROPHILS 75 (H) 40 - 70 % Final    MIXED CELLS 9 0.1 - 17 % Final    LYMPHOCYTES 17 14 - 44 % Final    ABS. NEUTROPHILS 8.3 1.8 - 9.5 K/UL Final    ABS. MIXED CELLS 1.0 0.0 - 2.3 K/uL Final    ABS. LYMPHOCYTES 1.9 1.1 - 5.9 K/UL Final     Comment: Test performed at [place] [address] Location. Results Reviewed by Medical Director. DF AUTOMATED   Final           Assessment:     1. Benign gestational thrombocytopenia, antepartum ([address]    2. Anemia affecting pregnancy in third trimester          Plan:   Gestational thrombocytopenia: The CBC from today reveals that her WBC count is 11.2, hemoglobin is 10.2g/dL, hematocrit is 30.2%, and her preliminary platelet count today is 61,000. I will recheck her again in 4 weeks.   We will coordinate with her OB/GYN physician for platelet transfusion at the time of her elective  /delivery if needed. I have also explained to the patient if the platelet count should decline below 20,000 on pregnancy she will be offered gammaglobulin as well. Anemia: H/H today is 10.2/30.2. The patient is taking a prenatal vitamin, but no additional iron supplement. I will check an iron profile and ferritin level at this time. She may need additional iron if the ferritin is significantly declined. I will have the patient return to clinic for complete reassessment again in 4 weeks. Orders Placed This Encounter    COMPLETE CBC & AUTO DIFF WBC    InHouse CBC ([employer])     Standing Status:   Future     Number of Occurrences:   1     Standing Expiration Date:       METABOLIC PANEL, COMPREHENSIVE     Standing Status:   Future     Number of Occurrences:   1     Standing Expiration Date:   2019    IRON PROFILE     Standing Status:   Future     Standing Expiration Date:   2019    FERRITIN     Standing Status:   Future     Standing Expiration Date:   2019         Jett Ty, [address] [place] [address]  2018     Please note: This document has been produced using voice recognition software. Unrecognized errors in transcription may be present.

## 2018-05-17 DIAGNOSIS — O99.013 ANEMIA AFFECTING PREGNANCY IN THIRD TRIMESTER: ICD-10-CM

## 2018-05-17 LAB
ALBUMIN SERPL-MCNC: 3.3 G/DL (ref 3.5–5.5)
ALBUMIN/GLOB SERPL: 1.3 {RATIO} (ref 1.2–2.2)
ALP SERPL-CCNC: 102 IU/L (ref 39–117)
ALT SERPL-CCNC: 6 IU/L (ref 0–32)
AST SERPL-CCNC: 10 IU/L (ref 0–40)
BILIRUB SERPL-MCNC: 0.4 MG/DL (ref 0–1.2)
BUN SERPL-MCNC: 6 MG/DL (ref 6–20)
BUN/CREAT SERPL: 13 (ref 9–23)
CALCIUM SERPL-MCNC: 8.4 MG/DL (ref 8.7–10.2)
CHLORIDE SERPL-SCNC: 102 MMOL/L (ref 96–106)
CO2 SERPL-SCNC: 20 MMOL/L (ref 18–29)
CREAT SERPL-MCNC: 0.48 MG/DL (ref 0.57–1)
GFR SERPLBLD CREATININE-BSD FMLA CKD-EPI: 133 ML/MIN/1.73
GFR SERPLBLD CREATININE-BSD FMLA CKD-EPI: 153 ML/MIN/1.73
GLOBULIN SER CALC-MCNC: 2.6 G/DL (ref 1.5–4.5)
GLUCOSE SERPL-MCNC: 83 MG/DL (ref 65–99)
POTASSIUM SERPL-SCNC: 3.7 MMOL/L (ref 3.5–5.2)
PROT SERPL-MCNC: 5.9 G/DL (ref 6–8.5)
SODIUM SERPL-SCNC: 135 MMOL/L (ref 134–144)

## 2018-05-22 ENCOUNTER — DOCUMENTATION ONLY (OUTPATIENT)
Dept: ONCOLOGY | Age: 28
End: 2018-05-22

## 2018-06-25 ENCOUNTER — HOSPITAL ENCOUNTER (OUTPATIENT)
Dept: ONCOLOGY | Age: 28
Discharge: HOME | End: 2018-06-25

## 2018-06-25 ENCOUNTER — OFFICE VISIT (OUTPATIENT)
Dept: ONCOLOGY | Age: 28
End: 2018-06-25

## 2018-06-25 VITALS
TEMPERATURE: 98.9 F | HEART RATE: 91 BPM | WEIGHT: 143 LBS | DIASTOLIC BLOOD PRESSURE: 91 MMHG | SYSTOLIC BLOOD PRESSURE: 142 MMHG | BODY MASS INDEX: 26.58 KG/M2

## 2018-06-25 DIAGNOSIS — D69.6 BENIGN GESTATIONAL THROMBOCYTOPENIA, ANTEPARTUM (HCC): Primary | ICD-10-CM

## 2018-06-25 DIAGNOSIS — O99.119 BENIGN GESTATIONAL THROMBOCYTOPENIA, ANTEPARTUM (HCC): Primary | ICD-10-CM

## 2018-06-25 DIAGNOSIS — D50.8 IRON DEFICIENCY ANEMIA SECONDARY TO INADEQUATE DIETARY IRON INTAKE: ICD-10-CM

## 2018-06-25 LAB
BASO+EOS+MONOS # BLD AUTO: 1.1 K/UL (ref 0–2.3)
BASO+EOS+MONOS # BLD AUTO: 9 % (ref 0.1–17)
DIFFERENTIAL METHOD BLD: ABNORMAL
ERYTHROCYTE [DISTWIDTH] IN BLOOD BY AUTOMATED COUNT: 12.2 % (ref 11.5–14.5)
HCT VFR BLD AUTO: 32 % (ref 36–48)
HGB BLD-MCNC: 10.8 G/DL (ref 12–16)
LYMPHOCYTES # BLD: 1.9 K/UL (ref 1.1–5.9)
LYMPHOCYTES NFR BLD: 17 % (ref 14–44)
MCH RBC QN AUTO: 26.7 PG (ref 25–35)
MCHC RBC AUTO-ENTMCNC: 33.8 G/DL (ref 31–37)
MCV RBC AUTO: 79.2 FL (ref 78–102)
NEUTS SEG # BLD: 8.3 K/UL (ref 1.8–9.5)
NEUTS SEG NFR BLD: 74 % (ref 40–70)
PLATELET # BLD AUTO: 76 K/UL (ref 135–420)
RBC # BLD AUTO: 4.04 M/UL (ref 4.1–5.1)
WBC # BLD AUTO: 11.3 K/UL (ref 4.5–13)

## 2018-06-25 NOTE — PATIENT INSTRUCTIONS
Iron Deficiency Anemia: Care Instructions  Your Care Instructions    Anemia means that you do not have enough red blood cells. Red blood cells carry oxygen around your body. When you have anemia, it can make you pale, weak, and tired. Many things can cause anemia. The most common cause is loss of blood. This can happen if you have heavy menstrual periods. It can also happen if you have bleeding in your stomach or bowel. You can also get anemia if you don't have enough iron in your diet or if it's hard for your body to absorb iron. In some cases, pregnancy causes anemia. That's because a pregnant woman needs more iron. Your doctor may do more tests to find the cause of your anemia. If a disease or other health problem is causing it, your doctor will treat that problem. It's important to follow up with your doctor to make sure that your iron level returns to normal.  Follow-up care is a key part of your treatment and safety. Be sure to make and go to all appointments, and call your doctor if you are having problems. It's also a good idea to know your test results and keep a list of the medicines you take. How can you care for yourself at home? · If your doctor recommended iron pills, take them as directed. ¨ Try to take the pills on an empty stomach. You can do this about 1 hour before or 2 hours after meals. But you may need to take iron with food to avoid an upset stomach. ¨ Do not take antacids or drink milk or anything with caffeine within 2 hours of when you take your iron. They can keep your body from absorbing the iron well. ¨ Vitamin C helps your body absorb iron. You may want to take iron pills with a glass of orange juice or some other food high in vitamin C.  ¨ Iron pills may cause stomach problems. These include heartburn, nausea, diarrhea, constipation, and cramps. It can help to drink plenty of fluids and include fruits, vegetables, and fiber in your diet.   ¨ It's normal for iron pills to make your stool a greenish or grayish black. But internal bleeding can also cause dark stool. So it's important to tell your doctor about any color changes. ¨ Call your doctor if you think you are having a problem with your iron pills. Even after you start to feel better, it will take several months for your body to build up its supply of iron. ¨ If you miss a pill, don't take a double dose. ¨ Keep iron pills out of the reach of small children. Too much iron can be very dangerous. · Eat foods with a lot of iron. These include red meat, shellfish, poultry, and eggs. They also include beans, raisins, whole-grain bread, and leafy green vegetables. · Steam your vegetables. This is the best way to prepare them if you want to get as much iron as possible. · Be safe with medicines. Do not take nonsteroidal anti-inflammatory pain relievers unless your doctor tells you to. These include aspirin, naproxen (Aleve), and ibuprofen (Advil, Motrin). · Liquid iron can stain your teeth. But you can mix it with water or juice and drink it with a straw. Then it won't get on your teeth. When should you call for help? Call 911 anytime you think you may need emergency care. For example, call if:  ? · You passed out (lost consciousness). ?Call your doctor now or seek immediate medical care if:  ? · You are short of breath. ? · You are dizzy or light-headed, or you feel like you may faint. ? · You have new or worse bleeding. ? Watch closely for changes in your health, and be sure to contact your doctor if:  ? · You feel weaker or more tired than usual.   ? · You do not get better as expected. Where can you learn more? Go to http://andrés-marline.info/. Enter R550 in the search box to learn more about \"Iron Deficiency Anemia: Care Instructions. \"  Current as of: October 13, 2016  Content Version: 11.4  © 1338-1786 [employer], [employer].  Care instructions adapted under license by [employer] (which disclaims liability or warranty for this information). If you have questions about a medical condition or this instruction, always ask your healthcare professional. [address] any warranty or liability for your use of this information. Thrombocytopenia: Care Instructions  Your Care Instructions    Thrombocytopenia is a low number of platelets in the blood. Platelets are the cells that help blood clot. If you don't have enough of them, your blood cannot clot well. So it is harder to stop bleeding. You may have low platelets because your bone marrow does not make them. Or your body's defenses (immune system) may destroy them. Having an enlarged spleen can also reduce the number of platelets in your blood. This is because they can get trapped in the enlarged spleen. Some diseases or medicines may also cause low platelets. But platelets may go back to normal levels if the disease is treated or the medicine is stopped. You may not need treatment if your problem is mild. If you do need treatment, you may have platelets added to your blood. Or you may get medicine to stop the loss of platelets or help your body make them. Follow-up care is a key part of your treatment and safety. Be sure to make and go to all appointments, and call your doctor if you are having problems. It's also a good idea to know your test results and keep a list of the medicines you take. How can you care for yourself at home? · Be safe with medicines. Take your medicines exactly as prescribed. Call your doctor if you think you are having a problem with your medicine. · Do not take aspirin or anti-inflammatory medicines unless your doctor says it is okay. Examples are ibuprofen (Advil, Motrin) and naproxen (Aleve). They may increase the risk of bleeding. · Avoid contact sports or activities that could cause you to fall. When should you call for help?   Call 911 anytime you think you may need emergency care. For example, call if:  ? · You passed out (lost consciousness). ? · You have signs of severe bleeding, which includes:  ¨ You have a severe headache that is different from past headaches. ¨ You vomit blood or what looks like coffee grounds. ¨ Your stools are maroon or very bloody. ?Call your doctor now or seek immediate medical care if:  ? · You are dizzy or lightheaded, or you feel like you may faint. ? · You have abnormal bleeding, such as:  ¨ Your stools are black and look like tar, or they have streaks of blood. ¨ You have blood in your urine. ¨ You have joint pain. ¨ You have bruises or blood spots under your skin. ? Watch closely for changes in your health, and be sure to contact your doctor if:  ? · You do not get better as expected. Where can you learn more? Go to http://andrés-marline.info/. Enter K691 in the search box to learn more about \"Thrombocytopenia: Care Instructions. \"  Current as of: October 13, 2016  Content Version: 11.4  © 1504-9654 [employer]. Care instructions adapted under license by [employer] (which disclaims liability or warranty for this information). If you have questions about a medical condition or this instruction, always ask your healthcare professional. [address] any warranty or liability for your use of this information.

## 2018-06-25 NOTE — PROGRESS NOTES
Hematology/Oncology  Progress Note    Name: Derik Leblanc  Date: 18   : 1990    Carmel Ceron MD     Ms. Nabila Renteria is a 33y.o. year old female who was seen for gestational thrombocytopenia      Subjective:      is in the office for follow-up. She has a history of gestational thrombocytopenia. Today she reports that she is doing reasonably well. She has not experienced any unexplained bruising or bleeding. She is here today for follow-up visit. She is now 37 weeks pregnant. The patient reports that she is scheduled for C section on 2018. Past medical history, family history, and social history: these were reviewed and remains unchanged. Past Medical History:   Diagnosis Date    Muscle pain     Recent change in weight      No past surgical history on file. Social History     Social History    Marital status: SINGLE     Spouse name: N/A    Number of children: N/A    Years of education: N/A     Occupational History    Not on file.      Social History Main Topics    Smoking status: Never Smoker    Smokeless tobacco: Never Used    Alcohol use No    Drug use: No    Sexual activity: Yes     Partners: Male     Birth control/ protection: None      Comment: Pregnant      Other Topics Concern    Not on file     Social History Narrative    No narrative on file     Family History   Problem Relation Age of Onset    No Known Problems Mother     No Known Problems Father     No Known Problems Sister     No Known Problems Brother     No Known Problems Maternal Aunt     No Known Problems Maternal Uncle     No Known Problems Paternal Aunt     No Known Problems Paternal Uncle     No Known Problems Maternal Grandmother     No Known Problems Maternal Grandfather     No Known Problems Paternal Grandmother     No Known Problems Paternal Grandfather     No Known Problems Other      Current Outpatient Prescriptions   Medication Sig Dispense Refill    PRENATAL VIT#96/FERROUS FUM/FA (PRENATAL VITAMIN) 27 mg iron- 800 mcg tab tablet   1    promethazine (PHENERGAN) 12.5 mg tablet   0       Review of Systems  Constitutional: The patient has no acute distress or discomfort. HEENT: The patient denies recent head trauma, eye pain, blurred vision,  hearing deficit, oropharyngeal mucosal pain or lesions, and the patient denies throat pain or discomfort. Lymphatics: The patient denies palpable peripheral lymphadenopathy. Hematologic: The patient denies having bruising, bleeding, or progressive fatigue. Respiratory: Patient denies having shortness of breath, cough, sputum production, fever, or dyspnea on exertion. Cardiovascular: The patient denies having leg pain, leg swelling, heart palpitations, chest permit, chest pain, or lightheadedness. The patient denies having dyspnea on exertion. Gastrointestinal: The patient denies having nausea, emesis, or diarrhea. The patient denies having any hematemesis or blood in the stool. Genitourinary: Patient denies having urinary urgency, frequency, or dysuria. The patient denies having blood in the urine. Psychological: The patient denies having symptoms of nervousness, anxiety, depression, or thoughts of harming himself some of this. Skin: Patient denies having skin rashes, skin, ulcerations, or unexplained itching or pruritus. Musculoskeletal: The patient denies having pain in the joints or bones. Objective: There were no vitals taken for this visit. ECOG PS 0  Physical Exam:   Gen. Appearance: The patient is in no acute distress. Skin: There is no bruise or rash. HEENT: The exam is unremarkable. Neck: Supple without lymphadenopathy or thyromegaly. Lungs: Clear to auscultation and percussion; there are no wheezes or rhonchi. Heart: Regular rate and rhythm; there are no murmurs, gallops, or rubs. Abdomen: Bowel sounds are present and normal.  There is no guarding, tenderness, or hepatosplenomegaly.   Extremities: There is no clubbing, cyanosis, or edema. Neurologic: There are no focal neurologic deficits. Lymphatics: There is no palpable peripheral lymphadenopathy. Musculoskeletal: The patient has full range of motion at all joints. There is no evidence of joint deformity or effusions. There is no focal joint tenderness. Psychological/psychiatric: There is no clinical evidence of anxiety, depression, or melancholy. Lab data:      Results for orders placed or performed during the hospital encounter of 18   CBC WITH 3 PART DIFF     Status: Abnormal   Result Value Ref Range Status    WBC 11.2 4.5 - 13.0 K/uL Final    RBC 3.65 (L) 4.10 - 5.10 M/uL Final    HGB 10.2 (L) 12.0 - 16 g/dL Final    HCT 30.2 (L) 36 - 48 % Final    MCV 82.7 78 - 102 FL Final    MCH 27.9 25.0 - 35.0 PG Final    MCHC 33.8 31 - 37 g/dL Final    RDW 12.5 11.5 - 14.5 % Final    NEUTROPHILS 75 (H) 40 - 70 % Final    MIXED CELLS 9 0.1 - 17 % Final    LYMPHOCYTES 17 14 - 44 % Final    ABS. NEUTROPHILS 8.3 1.8 - 9.5 K/UL Final    ABS. MIXED CELLS 1.0 0.0 - 2.3 K/uL Final    ABS. LYMPHOCYTES 1.9 1.1 - 5.9 K/UL Final     Comment: Test performed at [address]. Results Reviewed by Medical Director. DF AUTOMATED   Final           Assessment:     1. Benign gestational thrombocytopenia, antepartum ([address]    2. Iron deficiency anemia secondary to inadequate dietary iron intake          Plan:   Gestational thrombocytopenia: The CBC from today reveals that her WBC count is 11.3, hemoglobin is 10.8g/dL, hematocrit is 32.0%, and her preliminary platelet count today is 72,000. We recommend platelet transfusion prior to her elective  delivery to increase platelets to at least 100,000. This information will be sent to OB/GYN at [facility]. Anemia: H/H today is 10.8/32.0. The patient is taking a prenatal vitamin, but no additional iron supplement. I will check an iron profile and ferritin level at this time.  She may need additional iron if the ferritin is significantly declined. I will have the patient return to clinic for complete reassessment again in 4 weeks. No orders of the defined types were placed in this encounter. Osmin Null, [address] [place] [address], [address] [place] [address]  5/16/2018     Please note: This document has been produced using voice recognition software. Unrecognized errors in transcription may be present.

## 2018-06-25 NOTE — MR AVS SNAPSHOT
[facility] 
 
 
 [place] [address] [place] [address] 09 200 [facility] 
[phone] Patient: Shani Fontenot MRN: [id number] PGS:6/44/9927 Visit Information Date & Time Provider Department Dept. Phone Encounter #  
 6/25/2018  9:45 AM Prashant Montes MD [facility] Oncology [phone] [id number] Your Appointments 9/17/2018  9:45 AM  
Office Visit with Prashant Montes MD  
[address]  On[facility] Appt Note: 12 [place] [address], [place] 560 [facility] 3200 [facility], [address] [place] [address] 200 [facility] Upcoming Health Maintenance Date Due DTaP/Tdap/Td series (1 - Tdap) 3/11/2010 PAP [facility] CERVICAL CYTOLOGY 3/11/2010 Influenza Age 5 to Adult 8/1/2018 Allergies as of 6/25/2018  Review Complete On: 6/25/2018 By: Prashant Montes MD  
 No Known Allergies Current Immunizations  Never Reviewed No immunizations on file. Not reviewed this visit You Were Diagnosed With   
  
 Codes Comments Benign gestational thrombocytopenia, [facility]    -  Primary ICD-10-CM: O99.119, D69.6 ICD-9-CM: 649.33, 287.5 Iron deficiency anemia secondary to inadequate dietary iron intake     ICD-10-CM: D50.8 ICD-9-CM: 280.1 Vitals BP Pulse Temp Weight(growth percentile) BMI Smoking Status (!) 142/91 91 98.9 °F (37.2 °C) (Oral) 143 lb (64.9 kg) 26.58 kg/m2 Never Smoker BMI and BSA Data Body Mass Index Body Surface Area  
 26.58 kg/m 2 1.68 m 2 Your Updated Medication List  
  
   
This list is accurate as of 6/25/18 10:18 AM.  Always use your most recent med list.  
  
  
  
  
 prenatal vitamin 27 mg iron- 800 mcg Tab tablet  
  
 promethazine 12.5 mg tablet Commonly known as:  PHENERGAN We Performed the Following COMPLETE CBC & AUTO DIFF WBC [89971 CPT(R)] FERRITIN [59738 CPT(R)] IRON PROFILE [id number] CPT(R)] METABOLIC PANEL, COMPREHENSIVE [56283 CPT(R)] To-Do List   
 06/25/2018 Lab:  CBC WITH 3 PART DIFF   
  
 06/25/2018 Lab:  FERRITIN   
  
 06/25/2018 Lab:  IRON PROFILE   
  
 06/25/2018 Lab:  METABOLIC PANEL, COMPREHENSIVE Patient Instructions Iron Deficiency Anemia: Care Instructions Your Care Instructions Anemia means that you do not have enough red blood cells. Red blood cells carry oxygen around your body. When you have anemia, it can make you pale, weak, and tired. Many things can cause anemia. The most common cause is loss of blood. This can happen if you have heavy menstrual periods. It can also happen if you have bleeding in your stomach or bowel. You can also get anemia if you don't have enough iron in your diet or if it's hard for your body to absorb iron. In some cases, pregnancy causes anemia. That's because a pregnant woman needs more iron. Your doctor may do more tests to find the cause of your anemia. If a disease or other health problem is causing it, your doctor will treat that problem. It's important to follow up with your doctor to make sure that your iron level returns to normal. 
Follow-up care is a key part of your treatment and safety. Be sure to make and go to all appointments, and call your doctor if you are having problems. It's also a good idea to know your test results and keep a list of the medicines you take. How can you care for yourself at home? · If your doctor recommended iron pills, take them as directed. ¨ Try to take the pills on an empty stomach. You can do this about 1 hour before or 2 hours after meals. But you may need to take iron with food to avoid an upset stomach. ¨ Do not take antacids or drink milk or anything with caffeine within 2 hours of when you take your iron.  They can keep your body from absorbing the iron well. ¨ Vitamin C helps your body absorb iron. You may want to take iron pills with a glass of orange juice or some other food high in vitamin C. 
¨ Iron pills may cause stomach problems. These include heartburn, nausea, diarrhea, constipation, and cramps. It can help to drink plenty of fluids and include fruits, vegetables, and fiber in your diet. ¨ It's normal for iron pills to make your stool a greenish or grayish black. But internal bleeding can also cause dark stool. So it's important to tell your doctor about any color changes. ¨ Call your doctor if you think you are having a problem with your iron pills. Even after you start to feel better, it will take several months for your body to build up its supply of iron. ¨ If you miss a pill, don't take a double dose. ¨ Keep iron pills out of the reach of small children. Too much iron can be very dangerous. · Eat foods with a lot of iron. These include red meat, shellfish, poultry, and eggs. They also include beans, raisins, whole-grain bread, and leafy green vegetables. · Steam your vegetables. This is the best way to prepare them if you want to get as much iron as possible. · Be safe with medicines. Do not take nonsteroidal anti-inflammatory pain relievers unless your doctor tells you to. These include aspirin, naproxen (Aleve), and ibuprofen (Advil, Motrin). · Liquid iron can stain your teeth. But you can mix it with water or juice and drink it with a straw. Then it won't get on your teeth. When should you call for help? Call 911 anytime you think you may need emergency care. For example, call if: 
? · You passed out (lost consciousness). ?Call your doctor now or seek immediate medical care if: 
? · You are short of breath. ? · You are dizzy or light-headed, or you feel like you may faint. ? · You have new or worse bleeding. ? Watch closely for changes in your health, and be sure to contact your doctor if: ? · You feel weaker or more tired than usual.  
? · You do not get better as expected. Where can you learn more? Go to http://andrés-marline.info/. Enter L269 in the search box to learn more about \"Iron Deficiency Anemia: Care Instructions. \" Current as of: October 13, 2016 Content Version: 11.4 © 5859-2327 [employer]. Care instructions adapted under license by [employer] (which disclaims liability or warranty for this information). If you have questions about a medical condition or this instruction, always ask your healthcare professional. [address] any warranty or liability for your use of this information. Thrombocytopenia: Care Instructions Your Care Instructions Thrombocytopenia is a low number of platelets in the blood. Platelets are the cells that help blood clot. If you don't have enough of them, your blood cannot clot well. So it is harder to stop bleeding. You may have low platelets because your bone marrow does not make them. Or your body's defenses (immune system) may destroy them. Having an enlarged spleen can also reduce the number of platelets in your blood. This is because they can get trapped in the enlarged spleen. Some diseases or medicines may also cause low platelets. But platelets may go back to normal levels if the disease is treated or the medicine is stopped. You may not need treatment if your problem is mild. If you do need treatment, you may have platelets added to your blood. Or you may get medicine to stop the loss of platelets or help your body make them. Follow-up care is a key part of your treatment and safety. Be sure to make and go to all appointments, and call your doctor if you are having problems. It's also a good idea to know your test results and keep a list of the medicines you take. How can you care for yourself at home? · Be safe with medicines. Take your medicines exactly as prescribed. Call your doctor if you think you are having a problem with your medicine. · Do not take aspirin or anti-inflammatory medicines unless your doctor says it is okay. Examples are ibuprofen (Advil, Motrin) and naproxen (Aleve). They may increase the risk of bleeding. · Avoid contact sports or activities that could cause you to fall. When should you call for help? Call 911 anytime you think you may need emergency care. For example, call if: 
? · You passed out (lost consciousness). ? · You have signs of severe bleeding, which includes: 
¨ You have a severe headache that is different from past headaches. ¨ You vomit blood or what looks like coffee grounds. ¨ Your stools are maroon or very bloody. ?Call your doctor now or seek immediate medical care if: 
? · You are dizzy or lightheaded, or you feel like you may faint. ? · You have abnormal bleeding, such as: 
¨ Your stools are black and look like tar, or they have streaks of blood. ¨ You have blood in your urine. ¨ You have joint pain. ¨ You have bruises or blood spots under your skin. ? Watch closely for changes in your health, and be sure to contact your doctor if: 
? · You do not get better as expected. Where can you learn more? Go to http://andrés-marline.info/. Enter B328 in the search box to learn more about \"Thrombocytopenia: Care Instructions. \" Current as of: October 13, 2016 Content Version: 11.4 © 5124-2199 [employer]. Care instructions adapted under license by [employer] (which disclaims liability or warranty for this information). If you have questions about a medical condition or this instruction, always ask your healthcare professional. [place] [address] any warranty or liability for your use of this information. Introducing [facility]! [facility] introduces [employer] patient portal. Now you can access parts of your medical record, email your doctor's office, and request medication refills online. 1. In your internet browser, go to [email or web address] 2. Click on the First Time User? Click Here link in the Sign In box. You will see the New Member Sign Up page. 3. Enter your [employer] Access Code exactly as it appears below. You will not need to use this code after youve completed the sign-up process. If you do not sign up before the expiration date, you must request a new code. · [employer] Access Code: [email or web address] Expires: 9/23/2018 10:14 AM 
 
4. Enter the last four digits of your Social Security Number (xxxx) and Date of Birth (mm/dd/yyyy) as indicated and click Submit. You will be taken to the next sign-up page. 5. Create a [employer] ID. This will be your [employer] login ID and cannot be changed, so think of one that is secure and easy to remember. 6. Create a [employer] password. You can change your password at any time. 7. Enter your Password Reset Question and Answer. This can be used at a later time if you forget your password. 8. Enter your e-mail address. You will receive e-mail notification when new information is available in [address] 9. Click Sign Up. You can now view and download portions of your medical record. 10. Click the Download Summary menu link to download a portable copy of your medical information. If you have questions, please visit the Frequently Asked Questions section of the [employer] website. Remember, [employer] is NOT to be used for urgent needs. For medical emergencies, dial 911. Now available from your iPhone and Android! Please provide this summary of care documentation to your next provider. Your primary care clinician is listed as Jose Minor. If you have any questions after today's visit, please call [phone].

## 2018-06-26 LAB
ALBUMIN SERPL-MCNC: 3.3 G/DL (ref 3.5–5.5)
ALBUMIN/GLOB SERPL: 1.1 {RATIO} (ref 1.2–2.2)
ALP SERPL-CCNC: 174 IU/L (ref 39–117)
ALT SERPL-CCNC: 14 IU/L (ref 0–32)
AST SERPL-CCNC: 17 IU/L (ref 0–40)
BILIRUB SERPL-MCNC: 0.6 MG/DL (ref 0–1.2)
BUN SERPL-MCNC: 3 MG/DL (ref 6–20)
BUN/CREAT SERPL: 6 (ref 9–23)
CALCIUM SERPL-MCNC: 8.5 MG/DL (ref 8.7–10.2)
CHLORIDE SERPL-SCNC: 106 MMOL/L (ref 96–106)
CO2 SERPL-SCNC: 18 MMOL/L (ref 20–29)
CREAT SERPL-MCNC: 0.51 MG/DL (ref 0.57–1)
FERRITIN SERPL-MCNC: 7 NG/ML (ref 15–150)
GLOBULIN SER CALC-MCNC: 2.9 G/DL (ref 1.5–4.5)
GLUCOSE SERPL-MCNC: 68 MG/DL (ref 65–99)
IRON SATN MFR SERPL: 9 % (ref 15–55)
IRON SERPL-MCNC: 45 UG/DL (ref 27–159)
POTASSIUM SERPL-SCNC: 3.8 MMOL/L (ref 3.5–5.2)
PROT SERPL-MCNC: 6.2 G/DL (ref 6–8.5)
SODIUM SERPL-SCNC: 140 MMOL/L (ref 134–144)
TIBC SERPL-MCNC: 493 UG/DL (ref 250–450)
UIBC SERPL-MCNC: 448 UG/DL (ref 131–425)

## 2018-06-29 ENCOUNTER — HOSPITAL ENCOUNTER (OUTPATIENT)
Dept: INFUSION THERAPY | Age: 28
End: 2018-06-29

## 2018-07-02 ENCOUNTER — HOSPITAL ENCOUNTER (OUTPATIENT)
Dept: INFUSION THERAPY | Age: 28
End: 2018-07-02

## 2018-07-06 ENCOUNTER — APPOINTMENT (OUTPATIENT)
Dept: INFUSION THERAPY | Age: 28
End: 2018-07-06

## 2018-07-13 ENCOUNTER — APPOINTMENT (OUTPATIENT)
Dept: INFUSION THERAPY | Age: 28
End: 2018-07-13

## 2018-09-17 ENCOUNTER — HOSPITAL ENCOUNTER (OUTPATIENT)
Dept: ONCOLOGY | Age: 28
Discharge: HOME | End: 2018-09-17

## 2018-09-17 DIAGNOSIS — D69.6 BENIGN GESTATIONAL THROMBOCYTOPENIA, ANTEPARTUM (HCC): ICD-10-CM

## 2018-09-17 DIAGNOSIS — O99.119 BENIGN GESTATIONAL THROMBOCYTOPENIA, ANTEPARTUM (HCC): ICD-10-CM

## 2018-10-15 ENCOUNTER — HOSPITAL ENCOUNTER (OUTPATIENT)
Dept: ONCOLOGY | Age: 28
Discharge: HOME | End: 2018-10-15

## 2018-10-15 DIAGNOSIS — D69.6 BENIGN GESTATIONAL THROMBOCYTOPENIA, ANTEPARTUM (HCC): ICD-10-CM

## 2018-10-15 DIAGNOSIS — O99.119 BENIGN GESTATIONAL THROMBOCYTOPENIA, ANTEPARTUM (HCC): ICD-10-CM
